# Patient Record
Sex: MALE | Race: WHITE | NOT HISPANIC OR LATINO | Employment: UNEMPLOYED | URBAN - METROPOLITAN AREA
[De-identification: names, ages, dates, MRNs, and addresses within clinical notes are randomized per-mention and may not be internally consistent; named-entity substitution may affect disease eponyms.]

---

## 2021-12-14 ENCOUNTER — HOSPITAL ENCOUNTER (EMERGENCY)
Facility: CLINIC | Age: 21
Discharge: HOME | End: 2021-12-15
Attending: EMERGENCY MEDICINE | Admitting: EMERGENCY MEDICINE
Payer: COMMERCIAL

## 2021-12-14 VITALS
SYSTOLIC BLOOD PRESSURE: 140 MMHG | RESPIRATION RATE: 20 BRPM | OXYGEN SATURATION: 100 % | DIASTOLIC BLOOD PRESSURE: 81 MMHG | HEART RATE: 54 BPM | WEIGHT: 160 LBS | TEMPERATURE: 97.5 F

## 2021-12-14 DIAGNOSIS — S02.2XXA CLOSED FRACTURE OF NASAL BONE, INITIAL ENCOUNTER: ICD-10-CM

## 2021-12-14 DIAGNOSIS — S06.0X0A CONCUSSION WITHOUT LOSS OF CONSCIOUSNESS, INITIAL ENCOUNTER: ICD-10-CM

## 2021-12-14 PROCEDURE — 99284 EMERGENCY DEPT VISIT MOD MDM: CPT | Mod: 25

## 2021-12-14 ASSESSMENT — ENCOUNTER SYMPTOMS
NAUSEA: 1
NUMBNESS: 0
WEAKNESS: 0
DIZZINESS: 1
VOMITING: 0
HEADACHES: 1

## 2021-12-15 ENCOUNTER — APPOINTMENT (OUTPATIENT)
Dept: CT IMAGING | Facility: CLINIC | Age: 21
End: 2021-12-15
Attending: EMERGENCY MEDICINE
Payer: COMMERCIAL

## 2021-12-15 PROCEDURE — 70450 CT HEAD/BRAIN W/O DYE: CPT
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
## 2023-07-08 ENCOUNTER — APPOINTMENT (OUTPATIENT)
Dept: CT IMAGING | Facility: CLINIC | Age: 23
End: 2023-07-08
Attending: PHYSICIAN ASSISTANT
Payer: COMMERCIAL

## 2023-07-08 ENCOUNTER — HOSPITAL ENCOUNTER (EMERGENCY)
Facility: CLINIC | Age: 23
Discharge: HOME | End: 2023-07-08
Admitting: PHYSICIAN ASSISTANT
Payer: COMMERCIAL

## 2023-07-08 ENCOUNTER — APPOINTMENT (OUTPATIENT)
Dept: ULTRASOUND IMAGING | Facility: CLINIC | Age: 23
End: 2023-07-08
Attending: PHYSICIAN ASSISTANT
Payer: COMMERCIAL

## 2023-07-08 VITALS
DIASTOLIC BLOOD PRESSURE: 63 MMHG | TEMPERATURE: 98.5 F | HEART RATE: 46 BPM | OXYGEN SATURATION: 98 % | RESPIRATION RATE: 14 BRPM | WEIGHT: 210 LBS | SYSTOLIC BLOOD PRESSURE: 115 MMHG

## 2023-07-08 DIAGNOSIS — R10.9 CHRONIC ABDOMINAL PAIN: ICD-10-CM

## 2023-07-08 DIAGNOSIS — F12.90 MARIJUANA USE: ICD-10-CM

## 2023-07-08 DIAGNOSIS — R79.89 ELEVATED LFTS: ICD-10-CM

## 2023-07-08 DIAGNOSIS — G89.29 CHRONIC ABDOMINAL PAIN: ICD-10-CM

## 2023-07-08 LAB
ALBUMIN SERPL-MCNC: 4.2 G/DL (ref 3.5–5)
ALP SERPL-CCNC: 80 U/L (ref 45–120)
ALT SERPL W P-5'-P-CCNC: 156 U/L (ref 0–45)
ANION GAP SERPL CALCULATED.3IONS-SCNC: 7 MMOL/L (ref 5–18)
AST SERPL W P-5'-P-CCNC: 59 U/L (ref 0–40)
BILIRUB DIRECT SERPL-MCNC: 0.2 MG/DL
BILIRUB SERPL-MCNC: 0.6 MG/DL (ref 0–1)
BUN SERPL-MCNC: 11 MG/DL (ref 8–22)
CALCIUM SERPL-MCNC: 9.6 MG/DL (ref 8.5–10.5)
CHLORIDE BLD-SCNC: 105 MMOL/L (ref 98–107)
CO2 SERPL-SCNC: 29 MMOL/L (ref 22–31)
CREAT SERPL-MCNC: 1.05 MG/DL (ref 0.7–1.3)
ERYTHROCYTE [DISTWIDTH] IN BLOOD BY AUTOMATED COUNT: 13 % (ref 10–15)
GFR SERPL CREATININE-BSD FRML MDRD: >90 ML/MIN/1.73M2
GLUCOSE BLD-MCNC: 84 MG/DL (ref 70–125)
HCT VFR BLD AUTO: 47.2 % (ref 40–53)
HGB BLD-MCNC: 15.8 G/DL (ref 13.3–17.7)
LIPASE SERPL-CCNC: 20 U/L (ref 0–52)
MCH RBC QN AUTO: 28.6 PG (ref 26.5–33)
MCHC RBC AUTO-ENTMCNC: 33.5 G/DL (ref 31.5–36.5)
MCV RBC AUTO: 86 FL (ref 78–100)
PLATELET # BLD AUTO: 338 10E3/UL (ref 150–450)
POTASSIUM BLD-SCNC: 4.3 MMOL/L (ref 3.5–5)
PROT SERPL-MCNC: 7.1 G/DL (ref 6–8)
RBC # BLD AUTO: 5.52 10E6/UL (ref 4.4–5.9)
SODIUM SERPL-SCNC: 141 MMOL/L (ref 136–145)
WBC # BLD AUTO: 8.1 10E3/UL (ref 4–11)

## 2023-07-08 PROCEDURE — 80053 COMPREHEN METABOLIC PANEL: CPT | Performed by: PHYSICIAN ASSISTANT

## 2023-07-08 PROCEDURE — 250N000013 HC RX MED GY IP 250 OP 250 PS 637: Performed by: PHYSICIAN ASSISTANT

## 2023-07-08 PROCEDURE — 74176 CT ABD & PELVIS W/O CONTRAST: CPT

## 2023-07-08 PROCEDURE — 82248 BILIRUBIN DIRECT: CPT | Performed by: PHYSICIAN ASSISTANT

## 2023-07-08 PROCEDURE — 83690 ASSAY OF LIPASE: CPT | Performed by: PHYSICIAN ASSISTANT

## 2023-07-08 PROCEDURE — 36415 COLL VENOUS BLD VENIPUNCTURE: CPT | Performed by: PHYSICIAN ASSISTANT

## 2023-07-08 PROCEDURE — 76705 ECHO EXAM OF ABDOMEN: CPT

## 2023-07-08 PROCEDURE — 99284 EMERGENCY DEPT VISIT MOD MDM: CPT | Mod: 25

## 2023-07-08 PROCEDURE — 85027 COMPLETE CBC AUTOMATED: CPT | Performed by: PHYSICIAN ASSISTANT

## 2023-07-08 PROCEDURE — 250N000011 HC RX IP 250 OP 636: Performed by: PHYSICIAN ASSISTANT

## 2023-07-08 RX ORDER — ONDANSETRON 4 MG/1
4 TABLET, ORALLY DISINTEGRATING ORAL ONCE
Status: COMPLETED | OUTPATIENT
Start: 2023-07-08 | End: 2023-07-08

## 2023-07-08 RX ORDER — MAGNESIUM HYDROXIDE/ALUMINUM HYDROXICE/SIMETHICONE 120; 1200; 1200 MG/30ML; MG/30ML; MG/30ML
15 SUSPENSION ORAL ONCE
Status: COMPLETED | OUTPATIENT
Start: 2023-07-08 | End: 2023-07-08

## 2023-07-08 RX ORDER — ONDANSETRON 4 MG/1
4 TABLET, ORALLY DISINTEGRATING ORAL EVERY 8 HOURS PRN
Qty: 10 TABLET | Refills: 0 | Status: SHIPPED | OUTPATIENT
Start: 2023-07-08 | End: 2023-07-11

## 2023-07-08 RX ADMIN — ALUMINUM HYDROXIDE, MAGNESIUM HYDROXIDE, AND DIMETHICONE 15 ML: 200; 20; 200 SUSPENSION ORAL at 14:06

## 2023-07-08 RX ADMIN — ONDANSETRON 4 MG: 4 TABLET, ORALLY DISINTEGRATING ORAL at 13:57

## 2023-07-08 ASSESSMENT — ENCOUNTER SYMPTOMS
FEVER: 0
NAUSEA: 1
DYSURIA: 0
CHILLS: 0
SHORTNESS OF BREATH: 0
ABDOMINAL PAIN: 1
BACK PAIN: 0
CONSTIPATION: 0
HEMATURIA: 0
VOMITING: 1
FREQUENCY: 0
DIARRHEA: 0
BLOOD IN STOOL: 0

## 2023-07-08 ASSESSMENT — ACTIVITIES OF DAILY LIVING (ADL): ADLS_ACUITY_SCORE: 35

## 2023-07-08 NOTE — DISCHARGE INSTRUCTIONS
As we discussed, your CT and ultrasound showed no findings to explain your pain.  Your liver function tests are mildly elevated.  I have placed a referral for follow-up with a GI provider as well as a primary care provider, and included phone numbers above - please call to schedule.  We will start you on the medicine omeprazole to see if this improves your symptoms and I also provided prescription for Zofran for nausea.  If it anytime you develop fever, uncontrollable vomiting, worsening pain, black or bloody stools or vomit, please return to the ER for further evaluation.  Please avoid alcohol, spicy or acidic foods such as caffeine, coffee, etc.

## 2023-07-08 NOTE — ED TRIAGE NOTES
pt c/o mid abdominal pain for awhile and area tightens up, started 2 years ago comes and goes started about 1 week ago.  Pt states nausea and vomited twice, pain gets worse when eating.

## 2023-07-08 NOTE — ED PROVIDER NOTES
Emergency Department Encounter   NAME: Slim Orellana ; AGE: 22 year old male ; YOB: 2000 ; MRN: [id number] ; PCP: No Ref-Primary, Physician   ED PROVIDER: Merced Campbell PA-C    Evaluation Date & Time:   No admission date for patient encounter.    CHIEF COMPLAINT:  Abdominal Pain      Impression and Plan   MDM: Slim Orellana is a 22 year old male with a pertinent history of ADHD, major depressive disorder, generalized anxiety disorder, asthma, reactive airway disease, who presents to the ED by private vehicle for evaluation of abdominal pain.  The patient presents to the emergency department for evaluation of episodes of abdominal pain, nausea, and vomiting in the last 1 week that have been present over the past 2 years.  A similar episode started a week ago for him.  Here in the ED, he is afebrile and vitally stable.  He is very pleasant and in no acute distress.  This is a very well-appearing patient.  His abdomen is soft with normal bowel sounds and there is no distention, rebound, guarding, or evidence of peritonitis.  He does have tenderness over his right upper quadrant and left upper quadrant though no epigastric tenderness.  Considered a broad differential though my clinical suspicion for any acute or emergent surgical pathology in the abdomen or pelvis is quite low given 2 years of similar episodes.  Discussed plan for laboratory work-up with stepmother which they are agreeable to.  GI cocktail and Zofran ODT ordered for symptomatic relief. CT of abdomen/pelvis wo contrast ordered to rule out partial or intermittent obstructive process.     CT unremarkable.  Appendix visualized with normal appearance and no fever or leukocytosis.  No evidence of diverticulitis, colitis, or obstruction.  No active dark appearing emesis or stool, or khang blood, and hemoglobin hematocrit are within normal limits -no concern for active life-threatening GI bleed.  Despite vomiting, no concerning metabolic  derangements, ULISES, or evidence of significant dehydration.  His LFTs were mildly elevated with no previous for comparison.  Right upper quadrant ultrasound ordered which showed a normal appearance of the gallbladder, bile ducts, and the liver.  No evidence of acute cholecystitis, choledocholithiasis, cholangitis.  He reports to heavy drinking up until a year ago, and this could be the source of his mild liver abnormalities.  Patient was given a GI cocktail here in the ED and did have some mild improvement in his discomfort.  His overall presentation is more consistent with gastritis versus PUD versus cyclical vomiting syndrome in the setting of marijuana use.  Given multiple years of ongoing symptoms, will refer him to GI as well as a primary care provider to schedule outpatient cares.  Prescription for Zofran ODT and twice daily omeprazole provided and we discussed foods to avoid and lifestyle changes for home.  Patient will contact both GI and primary care to schedule follow-up.  We reviewed concerning signs and symptoms to return to the ED and he verbalized understanding.  Discharged home in good condition.      *Patient examination and workup was initiated in triage due to inpatient hospital and Emergency Department bed shortage resulting in long waiting room wait times. Patient and/or guardian's consent was obtained.       Medical Decision Making    History:    Supplemental history from: Documented in chart, if applicable    External Record(s) reviewed: Documented in chart, if applicable.    Work Up:    Chart documentation includes differential considered and any EKGs or imaging independently interpreted by provider, where specified.    In additional to work up documented, I considered the following work up: Documented in chart, if applicable.    External consultation:    Discussion of management with another provider: Documented in chart, if applicable    Complicating factors:    Care impacted by chronic  illness: N/A    Care affected by social determinants of health: N/A    Disposition considerations: Discharge. I prescribed additional prescription strength medication(s) as charted. See documentation for any additional details.      ED COURSE:  12:38 PM I met and introduced myself to the patient. I gathered initial history and performed my physical exam. We discussed plan for initial workup.   2:45 PM I rechecked the patient and discussed results and plan for US.  3:42 PM I rechecked the patient and discussed results, discharge, follow up, and reasons to return to the ED.     At the conclusion of the encounter I discussed the results of all the tests and the disposition. The questions were answered. The patient or family acknowledged understanding and was agreeable with the care plan.    FINAL IMPRESSION:    ICD-10-CM    1. Chronic abdominal pain  R10.9 Primary Care Referral    G89.29 Adult GI  Referral - Consult Only      2. Elevated LFTs  R79.89 Primary Care Referral     Adult GI  Referral - Consult Only      3. Marijuana use  F12.90 Primary Care Referral            MEDICATIONS GIVEN IN THE EMERGENCY DEPARTMENT:  Medications   ondansetron (ZOFRAN ODT) ODT tab 4 mg (4 mg Oral $Given 7/8/23 9749)   alum & mag hydroxide-simethicone (MAALOX) suspension 15 mL (15 mLs Oral $Given 7/8/23 1405)         NEW PRESCRIPTIONS STARTED AT TODAY'S ED VISIT:  Discharge Medication List as of 7/8/2023  3:47 PM      START taking these medications    Details   omeprazole (PRILOSEC) 20 MG DR capsule Take 1 capsule (20 mg) by mouth 2 times daily for 30 days, Disp-60 capsule, R-0, Local Print      ondansetron (ZOFRAN ODT) 4 MG ODT tab Take 1 tablet (4 mg) by mouth every 8 hours as needed for nausea, Disp-10 tablet, R-0, Local Print               HPI   Patient information was obtained from: Patient    Use of Intrepreter: N/A     Slim JORGE Orellana is a 22 year old male with a pertinent history of ADHD, major depressive  disorder, generalized anxiety disorder, asthma, reactive airway disease, who presents to the ED by private vehicle for evaluation of abdominal pain.     Per patient, he states that over the past 2 years, he has intermittent episodes of pain in his abdomen with nausea and vomiting.  These episodes typically last for about a week.  He says after eating, his abdomen tightens up, and then an hour later, he will vomit up the food.  In the past, he has had dark appearing vomit to though not recently.  No black or bloody stools.  He does not feel nauseated when he is not eating.  His episode started about a week ago, and he has had minimal oral intake this week.  He is here with his stepmother, who was concerned about his eating, prompting their visit to the ER.  He has not had any fevers or chills recently, no diarrhea or constipation.  No previous abdominal surgeries.  He states that he used to be a heavy drinker, although no alcohol use recently.  He does smoke daily marijuana, however did stop this for 2 months and his symptoms did not get better.        REVIEW OF SYSTEMS:  Review of Systems   Constitutional: Negative for chills and fever.   Respiratory: Negative for shortness of breath.    Cardiovascular: Negative for chest pain.   Gastrointestinal: Positive for abdominal pain, nausea and vomiting. Negative for blood in stool, constipation and diarrhea.   Genitourinary: Negative.  Negative for dysuria, frequency and hematuria.   Musculoskeletal: Negative for back pain.         Medical History     No past medical history on file.    No past surgical history on file.    No family history on file.         omeprazole (PRILOSEC) 20 MG DR capsule  ondansetron (ZOFRAN ODT) 4 MG ODT tab          Physical Exam     First Vitals:  Patient Vitals for the past 24 hrs:   BP Temp Temp src Pulse Resp SpO2 Weight   07/08/23 1552 115/63 -- -- (!) 46 -- 98 % --   07/08/23 1225 125/52 98.5  F (36.9  C) Temporal 52 14 96 % 95.3 kg (210  lb)         PHYSICAL EXAM:   Physical Exam  Vitals and nursing note reviewed.   Constitutional:       General: He is not in acute distress.     Appearance: He is not ill-appearing, toxic-appearing or diaphoretic.   HENT:      Head: Normocephalic.   Cardiovascular:      Rate and Rhythm: Normal rate and regular rhythm.      Heart sounds: Normal heart sounds.   Pulmonary:      Effort: Pulmonary effort is normal.      Breath sounds: Normal breath sounds.   Abdominal:      General: Abdomen is flat. Bowel sounds are normal. There is no distension.      Palpations: Abdomen is soft.      Tenderness: There is abdominal tenderness (RUQ and LUQ). There is no right CVA tenderness, left CVA tenderness, guarding or rebound. Negative signs include Mccurdy's sign and McBurney's sign.      Hernia: No hernia is present.   Skin:     General: Skin is warm and dry.   Neurological:      Mental Status: He is alert.             Results     LAB:  All pertinent labs reviewed and interpreted  Labs Ordered and Resulted from Time of ED Arrival to Time of ED Departure   HEPATIC FUNCTION PANEL - Abnormal       Result Value    Bilirubin Total 0.6      Bilirubin Direct 0.2      Protein Total 7.1      Albumin 4.2      Alkaline Phosphatase 80      AST 59 (*)      (*)    CBC WITH PLATELETS - Normal    WBC Count 8.1      RBC Count 5.52      Hemoglobin 15.8      Hematocrit 47.2      MCV 86      MCH 28.6      MCHC 33.5      RDW 13.0      Platelet Count 338     BASIC METABOLIC PANEL - Normal    Sodium 141      Potassium 4.3      Chloride 105      Carbon Dioxide (CO2) 29      Anion Gap 7      Urea Nitrogen 11      Creatinine 1.05      Calcium 9.6      Glucose 84      GFR Estimate >90     LIPASE - Normal    Lipase 20         RADIOLOGY:  Abdomen US, limited (RUQ only)   Final Result   IMPRESSION:   1.  Unremarkable exam.            CT Abdomen Pelvis w/o Contrast   Final Result   IMPRESSION:    1.  Nothing to explain the patient's pain or nausea.       2.  Mild diverticulosis in the colon.             Merced Campbell PA-C   Emergency Medicine   [facility] EMERGENCY ROOM       Merced Campbell PA-C  07/08/23 2984

## 2023-07-14 NOTE — CONFIDENTIAL NOTE
DIAGNOSIS:    Chronic abdominal pain   Elevated LFTs      Appt Date: 09.21.2023   NOTES STATUS DETAILS   OFFICE NOTE from referring provider Internal 07.08.2023 Merced Campbell PA-C   OFFICE NOTES from other specialists     DISCHARGE SUMMARY from hospital Internal 07.08.2023 Merced Campbell PA-C   MEDICATION LIST Internal    LIVER BIOSPY (IF APPLICABLE)      PATHOLOGY REPORTS      IMAGING     ENDOSCOPY (IF AVAILABLE)     COLONOSCOPY (IF AVAILABLE)     ULTRASOUND LIVER Internal 07.08.2023 US ABDOMEN LIMITED   CT OF ABDOMEN Internal 07.08.2023 CT ABDOMEN PELVIS W/O CONTRAST   MRI OF LIVER     FIBROSCAN, US ELASTOGRAPHY, FIBROSIS SCAN, MR ELASTOGRAPHY     LABS     HEPATIC PANEL (LIVER PANEL) Internal 07.08.2023   BASIC METABOLIC PANEL Internal 07.08.2023   COMPLETE METABOLIC PANEL Internal 07.08.2023   COMPLETE BLOOD COUNT (CBC) Internal 07.08.2023   INTERNATIONAL NORMALIZED RATIO (INR)     HEPATITIS C ANTIBODY     HEPATITIS C VIRAL LOAD/PCR     HEPATITIS C GENOTYPE     HEPATITIS B SURFACE ANTIGEN     HEPATITIS B SURFACE ANTIBODY     HEPATITIS B DNA QUANT LEVEL     HEPATITIS B CORE ANTIBODY

## 2023-07-17 NOTE — TELEPHONE ENCOUNTER
REFERRAL INFORMATION:    Referring Provider: Merced Campbell PA-C    Referring Clinic:  [facility] ED    Reason for Visit/Diagnosis: Chronic abdominal pain and elevated LFTs     FUTURE VISIT INFORMATION:    Appointment Date: 7/26/23    Appointment Time: 10:45 AM     NOTES STATUS DETAILS   OFFICE NOTE from Referring Provider N/A    OFFICE NOTE from Other Specialist N/A    HOSPITAL DISCHARGE SUMMARY/  ED VISITS Internal [facility]:  7/8/23- ED visit with Merced Campbell PA-C   OPERATIVE REPORT N/A    MEDICATION LIST Internal         ENDOSCOPY  N/A    COLONOSCOPY N/A    ERCP N/A    EUS N/A    STOOL TESTING N/A    PERTINENT LABS Internal [facility]:  7/8/23- Lipase, Hepatic function, BMP, & CBC   PATHOLOGY REPORTS (RELATED) N/A    IMAGING (CT, MRI, EGD, MRCP, Small Bowel Follow Through/SBT, MR/CT Enterography) Internal [facility]:  7/8/23- US Abd  7/8/23- CT Abd/Pelvis

## 2023-07-18 ENCOUNTER — TELEPHONE (OUTPATIENT)
Dept: GASTROENTEROLOGY | Facility: CLINIC | Age: 23
End: 2023-07-18
Payer: COMMERCIAL

## 2023-07-18 NOTE — TELEPHONE ENCOUNTER
Called to remind patient of their upcoming appointment with our GI clinic, on Wednesday, July 26th at 10:45am with Chinyere Rodriguez. This appointment is scheduled as a video visit. You will receive a call approximately 30 minutes prior to check you in, you must be in [place] for this visit., if your appointment is virtual (video or telephone) you need to be in [place] for the visit. To reschedule or cancel patient to call [phone].    Montse Diaz

## 2023-07-26 ENCOUNTER — PRE VISIT (OUTPATIENT)
Dept: GASTROENTEROLOGY | Facility: CLINIC | Age: 23
End: 2023-07-26

## 2023-09-21 ENCOUNTER — PRE VISIT (OUTPATIENT)
Dept: GASTROENTEROLOGY | Facility: CLINIC | Age: 23
End: 2023-09-21
Payer: COMMERCIAL

## 2023-09-21 ENCOUNTER — VIRTUAL VISIT (OUTPATIENT)
Dept: GASTROENTEROLOGY | Facility: CLINIC | Age: 23
End: 2023-09-21
Attending: INTERNAL MEDICINE
Payer: COMMERCIAL

## 2023-09-21 VITALS — WEIGHT: 185 LBS

## 2023-09-21 DIAGNOSIS — G89.29 CHRONIC ABDOMINAL PAIN: ICD-10-CM

## 2023-09-21 DIAGNOSIS — R10.9 CHRONIC ABDOMINAL PAIN: ICD-10-CM

## 2023-09-21 DIAGNOSIS — R79.89 ELEVATED LFTS: ICD-10-CM

## 2023-09-21 DIAGNOSIS — R10.12 ABDOMINAL PAIN, LEFT UPPER QUADRANT: Primary | ICD-10-CM

## 2023-09-21 DIAGNOSIS — K52.9 CHRONIC DIARRHEA: ICD-10-CM

## 2023-09-21 PROCEDURE — 99204 OFFICE O/P NEW MOD 45 MIN: CPT | Mod: VID | Performed by: INTERNAL MEDICINE

## 2023-09-21 ASSESSMENT — PAIN SCALES - GENERAL: PAINLEVEL: NO PAIN (0)

## 2023-09-21 NOTE — PROGRESS NOTES
"Slim Orellana is a 22 year old male who is being evaluated via a billable video visit.    Virtual Visit Details    Type of service:  Video Visit   Video Start Time:  0830  Video End Time: 0905    Originating Location (pt. Location): Home    Distant Location (provider location):  On-site  Platform used for Video Visit: [employer]    Date of Service: 9/21/2023     Referring Provider: Merced CHADWICK    Subjective:            Slim Orellana is a 22 year old male presenting for evaluation of abnormal liver tests    History of Present Illness   Slim Orellana is a 22 year old male with past medical history of ADHD, depression, general anxiety disorder, remote history of methamphetamine use, history of severe alcohol use disorder in remission who presents with concerns of upper quadrant pain, and one-time episode of abnormal liver tests.    [Per history obtained from [facility] and confirmed with patient]  Patient describes recurrent LUQ pain that has been recurrent for the past two years. He states that he was previously using smoking methamphetamines since 16 years old, along with alcohol use of up to 1.75L every two-three days. He stopped using meth around two years ago, and noticed the abdominal pain starting shortly afterwards. The abdominal pain started when he was eating more meals mostly consisting of fried fast food. His appetite had increased since stopping meth use, so he had increased his meal count to 3 meals from the previous roughly 1 or 2 a day. He states that the pain is located \"just below his left rib\" and feels like a \"brick, heavy, with sharp needles\" without radiation. After the pain would start he felt nauseous and would proceed to vomit. The pain would vary in how long it would last with some days being constant. He says that meals would \"start the pain\", and that drinking \"2-3 cups of water\" would help. During these times he would start to feel a \"bile acidity\" in the back of his throat. He endorses " "hx of bloody colored emesis from repeated bouts of vomiting almost daily, diarrhea which was was a brown and green watery consistency up to 3 times a day. He denies any fevers, travel within past 5-10 years, hx of abdominal trauma, supplement use, recent STI testing, or acetaminophen or ibuprofen use.     On 7/08/2023: Patient was experiencing the worst abdominal pain he's ever had. He says that it was on his whole left side and extended down below his belly button. He had continued profuse vomiting, diarrhea and was unable to \"keep anything down\". He then went in to the ED for evaluation.     In the ED 7/08/2023:  Afebrile endorsing abdominal pain with nausea and vomiting. CT abdomen/pelvis wo contrast unremarkable, RUQ ultrasound without gallstones, normal appearing gallbladder, bile ducts and liver. AST 59, , with other labs unremarkable     After the ED: He states that he stopped consuming alcohol or meth. He also started a new diet of \"no fried food, red sauce or oils\", which has improved abdominal pain. He states that now he gets the abdominal pain \"once every 2-3 weeks\" and is located \"across the underside of my ribs across my stomach\". He endorses continued \"diarrhea\" of 2-3 watery green bowel movements a day. He still endorses occasional nausea with 1x vomiting when experiencing abdominal pain but has improved.    Past Medical History:  Depression  Generalized anxiety disorder  ADHD  History of methamphetamine use disorder  History of alcohol use disorder    Surgical History:  No past surgical history on file.    Social History:    Lives with parents in house  Alcohol: 1.75L vodka every 2-3 days (stopped 2 months ago)  Illicit: Previous meth use (started at 16, stopped 2 months ago)  Current marijuana use  1x heroin use  Smokes approximately 1 pack of cigarettes per day     Sexual  - Currently sexually active (prefers both males and females)  - States he uses condoms with every sexual encounter  - " Currently single    Family History:  No overt family history of liver disease.    Medications:  No current outpatient medications on file.     No current facility-administered medications for this visit.       Review of Systems  A complete 10 point review of systems was asked and answered in the negative unless specifically commented upon in the HPI    Objective:         Vitals:    09/21/23 0810   Weight: 83.9 kg (185 lb)     There is no height or weight on file to calculate BMI.     Physical Exam    Vitals reviewed.   Constitutional: Well-developed, well-nourished, in no apparent distress.    HEENT: Normocephalic. no scleral icterus.   Neck/Lymph: Normal ROM  Respiratory: Normal respiratory excursion   Skin:  no overt jaundice  Musculoskeletal:  ROM intact, normal muscle bulk    Psychiatric: Normal mood and affect. Behavior is normal.  Neuro:  no tremor    Labs and Diagnostic tests:  Lab Results   Component Value Date    BILITOTAL 0.6 07/08/2023     07/08/2023    AST 59 07/08/2023    ALKPHOS 80 07/08/2023     Lab Results   Component Value Date    ALBUMIN 4.2 07/08/2023    PROTTOTAL 7.1 07/08/2023          Imaging:  reviewed        Assessment and Plan:    Abnormal Liver Tests:    -One-time abnormal lab value in July with elevated ALT and AST is of unclear significance  -Certainly could have been related to methamphetamine or alcohol use that was occurring at that time  -Imaging did not demonstrate any overt acute or chronic changes to explain liver disease.    - We will repeat LFTs  - Screening for HBV, HCV, HIV    LUQ pain:  - Will obtain an EGD with biopsies given persistent symptoms    Diarrhea:  - Unclear etiology  - Will obtain stool studies  - PRN Imodium    Follow Up:  PRN     Thank you very much for the opportunity to participate in the care of this patient.  If you have any further questions, please don't hesitate to contact our office.    Thomas M. Leventhal, M.D.   of  Medicine  Advanced & Transplant Hepatology  [facility]

## 2023-09-21 NOTE — NURSING NOTE
Is the patient currently in the state of [place]? YES    Visit mode:VIDEO    If the visit is dropped, the patient can be reconnected by: VIDEO VISIT: Text to cell phone:   Telephone Information:   Mobile [phone]       Will anyone else be joining the visit? NO  (If patient encounters technical issues they should call [phone])    How would you like to obtain your AVS? MyChart    Are changes needed to the allergy or medication list? No    Reason for visit: Video Visit (Consult)    Rosa RICHARDS

## 2023-09-21 NOTE — LETTER
"    9/21/2023         RE: Slim Orellana  [address]  [facility] [place] [id number]        Dear Colleague,    Thank you for referring your patient, Slim Orellana, to the [facility] [place]. Please see a copy of my visit note below.        Date of Service: 9/21/2023     Referring Provider: Merced CHADWICK    Subjective:            Slim Orellana is a 22 year old male presenting for evaluation of abnormal liver tests    History of Present Illness   Slim Orellana is a 22 year old male with past medical history of ADHD, depression, general anxiety disorder, remote history of methamphetamine use, history of severe alcohol use disorder in remission who presents with concerns of upper quadrant pain, and one-time episode of abnormal liver tests.    [Per history obtained from [facility] and confirmed with patient]  Patient describes recurrent LUQ pain that has been recurrent for the past two years. He states that he was previously using smoking methamphetamines since 16 years old, along with alcohol use of up to 1.75L every two-three days. He stopped using meth around two years ago, and noticed the abdominal pain starting shortly afterwards. The abdominal pain started when he was eating more meals mostly consisting of fried fast food. His appetite had increased since stopping meth use, so he had increased his meal count to 3 meals from the previous roughly 1 or 2 a day. He states that the pain is located \"just below his left rib\" and feels like a \"brick, heavy, with sharp needles\" without radiation. After the pain would start he felt nauseous and would proceed to vomit. The pain would vary in how long it would last with some days being constant. He says that meals would \"start the pain\", and that drinking \"2-3 cups of water\" would help. During these times he would start to feel a \"bile acidity\" in the back of his throat. He endorses hx of bloody colored emesis from repeated bouts of " "vomiting almost daily, diarrhea which was was a brown and green watery consistency up to 3 times a day. He denies any fevers, travel within past 5-10 years, hx of abdominal trauma, supplement use, recent STI testing, or acetaminophen or ibuprofen use.     On 7/08/2023: Patient was experiencing the worst abdominal pain he's ever had. He says that it was on his whole left side and extended down below his belly button. He had continued profuse vomiting, diarrhea and was unable to \"keep anything down\". He then went in to the ED for evaluation.     In the ED 7/08/2023:  Afebrile endorsing abdominal pain with nausea and vomiting. CT abdomen/pelvis wo contrast unremarkable, RUQ ultrasound without gallstones, normal appearing gallbladder, bile ducts and liver. AST 59, , with other labs unremarkable     After the ED: He states that he stopped consuming alcohol or meth. He also started a new diet of \"no fried food, red sauce or oils\", which has improved abdominal pain. He states that now he gets the abdominal pain \"once every 2-3 weeks\" and is located \"across the underside of my ribs across my stomach\". He endorses continued \"diarrhea\" of 2-3 watery green bowel movements a day. He still endorses occasional nausea with 1x vomiting when experiencing abdominal pain but has improved.    Past Medical History:  Depression  Generalized anxiety disorder  ADHD  History of methamphetamine use disorder  History of alcohol use disorder    Surgical History:  No past surgical history on file.    Social History:  SH  Lives with parents in house  Alcohol: 1.75L vodka every 2-3 days (stopped 2 months ago)  Illicit: Previous meth use (started at 16, stopped 2 months ago)  Current marijuana use  1x heroin use  Smokes approximately 1 pack of cigarettes per day     Sexual  - Currently sexually active (prefers both males and females)  - States he uses condoms with every sexual encounter  - Currently single    Family History:  No overt " family history of liver disease.    Medications:  No current outpatient medications on file.     No current facility-administered medications for this visit.       Review of Systems  A complete 10 point review of systems was asked and answered in the negative unless specifically commented upon in the HPI    Objective:         Vitals:    09/21/23 0810   Weight: 83.9 kg (185 lb)     There is no height or weight on file to calculate BMI.     Physical Exam    Vitals reviewed.   Constitutional: Well-developed, well-nourished, in no apparent distress.    HEENT: Normocephalic. no scleral icterus.   Neck/Lymph: Normal ROM  Respiratory: Normal respiratory excursion   Skin:  no overt jaundice  Musculoskeletal:  ROM intact, normal muscle bulk    Psychiatric: Normal mood and affect. Behavior is normal.  Neuro:  no tremor    Labs and Diagnostic tests:  Lab Results   Component Value Date    BILITOTAL 0.6 07/08/2023     07/08/2023    AST 59 07/08/2023    ALKPHOS 80 07/08/2023     Lab Results   Component Value Date    ALBUMIN 4.2 07/08/2023    PROTTOTAL 7.1 07/08/2023          Imaging:  reviewed        Assessment and Plan:    Abnormal Liver Tests:    -One-time abnormal lab value in July with elevated ALT and AST is of unclear significance  -Certainly could have been related to methamphetamine or alcohol use that was occurring at that time  -Imaging did not demonstrate any overt acute or chronic changes to explain liver disease.    - We will repeat LFTs  - Screening for HBV, HCV, HIV    LUQ pain:  - Will obtain an EGD with biopsies given persistent symptoms    Diarrhea:  - Unclear etiology  - Will obtain stool studies  - PRN Imodium    Follow Up:  PRN     Thank you very much for the opportunity to participate in the care of this patient.  If you have any further questions, please don't hesitate to contact our office.    Thomas M. Leventhal, M.D.  Associate Professor of Medicine  Advanced & Transplant Hepatology  The  [facility]      Again, thank you for allowing me to participate in the care of your patient.        Sincerely,        Thomas M. Leventhal, MD

## 2023-09-21 NOTE — PATIENT INSTRUCTIONS
- EGD to evaluate your abdominal pain    - Blood tests to recheck your liver    - stool tests to eval     - Okay to Imodium for loose stools

## 2023-09-25 ENCOUNTER — TELEPHONE (OUTPATIENT)
Dept: GASTROENTEROLOGY | Facility: CLINIC | Age: 23
End: 2023-09-25
Payer: COMMERCIAL

## 2023-09-25 NOTE — TELEPHONE ENCOUNTER
Pre assessment completed for upcoming procedure.      Procedure details:    Patient scheduled for Upper endoscopy (EGD) on 9/27/23.     Arrival time: 1400. Procedure time 1500    Pre op exam needed? N/A    Facility location: [facility]; [address] [place] [address], 5[address] Floor, [place] [address]    Sedation type: MAC    Indication for procedure: EGD with gastric biopsies for refractory n/v and abdominal pain     COVID policy reviewed.    Designated  policy reviewed. Instructed to have someone stay 24 hours post procedure.       Chart review:     Electronic implanted devices? No    Diabetic? No    Diabetic medication HOLDING recommendations: (if applicable)  Oral diabetic medications: N/A  Diabetic injectables: N/A  Insulin: N/A    Medication review:    Anticoagulants? No    NSAIDS? No    Other medication HOLDING recommendations:  N/A      Prep for procedure:     Prep instructions sent via email     [email or web address]  Patient requests communication via unencrypted e-mail. This includes prep instructions. Patient acknowledges that they have agreed to accept the risks and receive unencrypted communications for this incidence.     Reviewed procedure prep instructions.     Patient verbalized understanding and had no questions or concerns at this time.        Zayda Dale RN  Endoscopy Procedure Pre Assessment RN  [phone] option 4

## 2023-09-25 NOTE — LETTER
Upper endoscopy (EGD)     Procedure date: 9/27/2023    Anticipated arrival time: 2:00 PM   (Procedure Times are Subject to Change)    Facility location: [facility]; [address], 5th Floor, [place] [address] - Check in location: 5th Floor. Parking information: Self pay parking is available in West surface lot directly across from the  main entrance of the [facility] [place]. Entry and exit to this lot is on [facility]. In  addition, self pay parking is also available in [address] [employer] [address] ([address]).  We have dedicated patient only spots on 1st floor of [address] ramp.  services are available for patients with limited mobility. Services available Monday-Friday, 7:00a.m.-  5:00p.m.      Important Procedure Reminders:     Prep Instructions:   Instructions on how to prepare for your upcoming procedure are found below. Please read instructions carefully. Deviation from instructions may result in less than desired outcomes and procedure may need to be rescheduled. If you have additional questions regarding how to prepare for your upcoming procedure, please contact our endoscopy pre assessment nurses at [phone] option 4.      Policy:   On the day of your procedure, please designatean adult(s) who can drive you home stay with you for the next 24 hours. The medicines used in the exam will make you sleepy. You will not be able to drive. You cannot take public transportation, ride share services, or non-medical taxi service without a responsible caregiver.  Medical transport services are allowed with the requirement that a responsible caregiver will receive you at your destination.  We require that drivers and caregivers are confirmed prior to your procedure.    Day of procedure:  Please do not wear jewelry (i.e. earrings, rings, necklaces, watches, etc) . Leave your purse, billfold, credit cards, and other valuables at home.   Bring insurance card and ID.     To cancel or reschedule your  procedure:   Please call our endoscopy scheduling team at: [facility]: [phone], option 2. Monday through Friday, 7:00am-5:00pm.      Medication Reminders:    Please note the following medication holding recommendations:   N/A    ----------------------------------------------------------------------------------------------------------------------------------------------------------------------------------------------------------------------------------------------------------------------    Instructions for Your Upper Endoscopy    Pre-Assessment Phone Number: [facility]; [phone] option 4      You will receive a call from a Nurse to review instructions and health history.  This assessment must be completed prior to your procedure.  Failure to complete the Nurse assessment phone call may result in the procedure being cancelled.    On the day of your procedure, please designatean adult(s) who can drive you home stay with you for the next 24 hours. The medicines used in the exam will make you sleepy. You will not be able to drive.    You cannot take public transportation, ride share services, or non-medical taxi service without a responsible caregiver.  Medical transport services are allowed with the requirement that a responsible caregiver will receive you at your destination.  We require that drivers and caregivers are confirmed prior to your procedure.        What is an upper endoscopy?  - This is an exam that checks for problems linked to heartburn, swallowing or belly (abdominal) pain or other symptoms of the upper GI tract. Depending on your symptoms, the doctor will look at your esophagus (food pipe), stomach and the part of the stomach that enters the small intestine.     Getting ready  - Dress in comfortable, loose clothing.  - Bring your insurance card. Leave your purse, billfold, credit cards and other valuables at home.  - Bring a list of your medicines and  known allergies. If you have a pacemaker or ICD, please bring your information card.  - We do our best to stay on time, but there may be a delay. Please bring something to pass the time, such as a newspaper or book.    - Important: You must complete all steps before the exam.     Seven days before the exam   - Talk to your doctor: If you take blood-thinners (such as Coumadin, Plavix, Xarelto), your prescription or schedule may need to change before the test.  - Talk to your prescribing provider: If you take prescription NSAIDS (such as Sulindac, Celebrex, Mobic, Relafen). Your prescribing provider will tell you what to do.   - Continue taking prescribed aspirin; talk to your prescribing doctor with any concerns.    - If you have diabetes: Ask to have your exam early in the morning. Also, ask your doctor if you should change your diet or medicines    One day before the exam   - Stop eating all solid foods 8 hours prior to arrival time. You may drink clear liquids.   **If you have achalasia (treated or untreated) or gastroparesis, please be on a clear liquid diet for 24 hours prior to your exam and stop drinking all liquids at midnight.   - Stop taking sucralfate medication.  - Stop taking NSAID pain relievers, such as Advil, Ibuprofen, Motrin, etc.  You may take Tylenol.    Day of the exam  - You may drink clear liquids until 2 hours before your arrival time.  - You may take all of your morning medicines (except for diabetes pills) as usual with 4 oz. of water up to 2 hours before your arrival time.  - If you take diabetes medicine (pills): do not take them the morning of your test.  - Bring a list of your medicines and known allergies.  - Please do not wear jewelry (i.e. earrings, rings, necklaces, watches, etc) . Leave your purse, billfold, credit cards, and other valuables at home.   - Please arrive with an adult who can take you home after the test: The medicine will make you sleepy. If you do not have a ,  we may cancel your test.     What are clear liquids?   You may have:  - Water, tea, coffee (no cream)  - Soda pop, Gatorade   - Clear nutrition drinks (Enlive, Resource Breeze)   - Jell-O, Popsicles (no milk or fruit pieces) or sorbet   - Fat-free soup broth or bouillon  - Plain hard cand, such as clear life savers   - Clear juices and fruit-flavored drinks such as apple juice, white grape juice, Hi-C and Ja-Aid    Do not have:  - Milk or milk products such as ice cream, malts or shakes  - Juices with pulp such as orange, grapefruit, pineapple or tomato juice  - Cream soups of any kind  - Alcohol       During the exam  - The exam lasts from 10 to 20 minutes.   - We will review the risks and benefits of the exam. We will then ask you to sign a consent form.  - We will place a small needle (IV) in your hand or arm. We will give you medicine through the IV to keep you comfortable.   - The endoscope will be advanced through your mouth and the exam completed.  - When we put air into your stomach, you may feel full or have mild cramps. We will remove the air at the end of the exam.  - To reduce discomfort, breathe at a slow, even pace. Try to relax the muscles in your neck and shoulders.  - We may take a small piece of tissue (a biopsy) to test in the lab. It will not hurt. We may also take pictures of your insides.     After the exam  - You will rest in the recovery area until you feel ready to leave. This takes about 30 to 60 minutes.   - We will remove the IV.  - You may burp up air left in your stomach.  - You may feel drowsy or a little dizzy from the medicine.   - Your doctor will discuss your results. You will receive your test results in 7 to 10 business days by letter or MyChart.   - Your throat may feel numb. One hour after the exam, swallow a small amount of cool water. If you can swallow easily, you may go back to your regular diet and medicines.  - Your throat may be sore for the rest of the day. Throat  lozenges or ice chips may help.  - Do not drive for 24 hours.    Call us at once if you have:  - Unusual pain or problems swallowing, unusual stomach or chest pain.  - Vomit that looks like coffee grounds or black or bloody stools (bowel movements).  - A fever above 100.6  F (37.5  C) when taken under the tongue.    Test results  - You will receive your results in 7 to 10 business days by phone, letter or MyChart.    For questions or appointments, call:  [facility]   [phone], option 2  Monday through Friday, 7 a.m. to 5 p.m.  (If it is after hours please reach out to the clinic or provider you were scheduled with.)    You should be aware that your endoscopist may be a part of a study to improve endoscopy procedures.  As part of a study, pictures gathered from your procedure may be stored and analyzed in a de-identified manner.

## 2023-09-25 NOTE — TELEPHONE ENCOUNTER
"Endoscopy Scheduling Screen    Have you had a positive Covid test in the last 14 days?  No    Are you active on [facility]?   No - link already sent and patient will finish sign up to get appt info.     What insurance is in the chart?  Other:      Ordering/Referring Provider: LEVENTHAL, THOMAS MICHAEL    (If ordering provider performs procedure, schedule with ordering provider unless otherwise instructed. )    BMI: There is no height or weight on file to calculate BMI. 28.1    Sedation Ordered  MAC/deep sedation.   BMI<= 45 45 < BMI <= 48 48 < BMI < = 50  BMI > 50   No Restrictions No [facility]  No [facility]  [facility] with exceptions [facility] Only OR Only       Are you taking any prescription medications for pain 3 or more times per week?   No    Do you have a history of malignant hyperthermia or adverse reaction to anesthesia?  No     Have you been diagnosed or told you have pulmonary hypertension?   No    Do you have an LVAD?  No    Have you been told you have moderate to severe sleep apnea?  No    Have you been told you have COPD, asthma, or any other lung disease?  Yes     What breathing problems do you have?  Asthma     Do you use home oxygen?  No    Have your breathing problems required an ED visit or hospitalization in the last year?  No    Do you have any heart conditions?  No     Have you ever had an organ transplant?   No    Have you ever had or are you awaiting a heart or lung transplant?   No    Have you had a stroke or transient ischemic attack (TIA aka \"mini stroke\" in the last 6 months?   No    Have you been diagnosed with or been told you have cirrhosis of the liver?   No    Are you currently on dialysis?   No    Do you need assistance transferring?   No    BMI: There is no height or weight on file to calculate BMI. 28.1    Is patients BMI > 40 and scheduling location [facility]?  No    Do you take an injectable medication for weight loss or diabetes (excluding insulin)?  No    Do you take the medication " Naltrexone?  No    Do you take blood thinners?  No       Prep   Are you currently on dialysis or do you have chronic kidney disease?  No    Do you have a diagnosis of diabetes?  No    Do you have a diagnosis of cystic fibrosis (CF)?  No    On a regular basis do you go 3 -5 days between bowel movements?  No    BMI > 40?  No    Preferred Pharmacy:  No Pharmacies Listed    Final Scheduling Details   Colonoscopy prep sent?  N/A    Procedure scheduled  Upper endoscopy (EGD)    Surgeon:  JILLIAN     Date of procedure:  9/27/2023     Pre-OP / PAC:   No - Not required for this site.    Location  [facility] - Per order.    Sedation   MAC/Deep Sedation - Per order.      Patient Reminders:   You will receive a call from a Nurse to review instructions and health history.  This assessment must be completed prior to your procedure.  Failure to complete the Nurse assessment may result in the procedure being cancelled.      On the day of your procedure, please designate an adult(s) who can drive you home stay with you for the next 24 hours. The medicines used in the exam will make you sleepy. You will not be able to drive.      You cannot take public transportation, ride share services, or non-medical taxi service without a responsible caregiver.  Medical transport services are allowed with the requirement that a responsible caregiver will receive you at your destination.  We require that drivers and caregivers are confirmed prior to your procedure.

## 2023-09-27 ENCOUNTER — ANESTHESIA (OUTPATIENT)
Dept: SURGERY | Facility: AMBULATORY SURGERY CENTER | Age: 23
End: 2023-09-27
Payer: COMMERCIAL

## 2023-09-27 ENCOUNTER — ANESTHESIA EVENT (OUTPATIENT)
Dept: SURGERY | Facility: AMBULATORY SURGERY CENTER | Age: 23
End: 2023-09-27
Payer: COMMERCIAL

## 2023-09-27 ENCOUNTER — HOSPITAL ENCOUNTER (OUTPATIENT)
Facility: AMBULATORY SURGERY CENTER | Age: 23
Discharge: HOME | End: 2023-09-27
Attending: INTERNAL MEDICINE | Admitting: INTERNAL MEDICINE
Payer: COMMERCIAL

## 2023-09-27 VITALS
SYSTOLIC BLOOD PRESSURE: 122 MMHG | RESPIRATION RATE: 16 BRPM | WEIGHT: 185 LBS | HEIGHT: 68 IN | TEMPERATURE: 98 F | DIASTOLIC BLOOD PRESSURE: 74 MMHG | BODY MASS INDEX: 28.04 KG/M2 | HEART RATE: 81 BPM | OXYGEN SATURATION: 99 %

## 2023-09-27 VITALS — HEART RATE: 78 BPM

## 2023-09-27 DIAGNOSIS — K29.70 GASTRITIS WITHOUT BLEEDING, UNSPECIFIED CHRONICITY, UNSPECIFIED GASTRITIS TYPE: Primary | ICD-10-CM

## 2023-09-27 LAB — UPPER GI ENDOSCOPY: NORMAL

## 2023-09-27 PROCEDURE — 88305 TISSUE EXAM BY PATHOLOGIST: CPT | Mod: TC | Performed by: INTERNAL MEDICINE

## 2023-09-27 PROCEDURE — 43239 EGD BIOPSY SINGLE/MULTIPLE: CPT | Performed by: INTERNAL MEDICINE

## 2023-09-27 RX ORDER — KETAMINE HYDROCHLORIDE 10 MG/ML
INJECTION INTRAMUSCULAR; INTRAVENOUS PRN
Status: DISCONTINUED | OUTPATIENT
Start: 2023-09-27 | End: 2023-09-27

## 2023-09-27 RX ORDER — NALOXONE HYDROCHLORIDE 0.4 MG/ML
0.2 INJECTION, SOLUTION INTRAMUSCULAR; INTRAVENOUS; SUBCUTANEOUS
Status: DISCONTINUED | OUTPATIENT
Start: 2023-09-27 | End: 2023-09-28 | Stop reason: HOSPADM

## 2023-09-27 RX ORDER — NALOXONE HYDROCHLORIDE 0.4 MG/ML
0.4 INJECTION, SOLUTION INTRAMUSCULAR; INTRAVENOUS; SUBCUTANEOUS
Status: DISCONTINUED | OUTPATIENT
Start: 2023-09-27 | End: 2023-09-28 | Stop reason: HOSPADM

## 2023-09-27 RX ORDER — PROPOFOL 10 MG/ML
INJECTION, EMULSION INTRAVENOUS PRN
Status: DISCONTINUED | OUTPATIENT
Start: 2023-09-27 | End: 2023-09-27

## 2023-09-27 RX ORDER — LIDOCAINE HYDROCHLORIDE 20 MG/ML
INJECTION, SOLUTION INFILTRATION; PERINEURAL PRN
Status: DISCONTINUED | OUTPATIENT
Start: 2023-09-27 | End: 2023-09-27

## 2023-09-27 RX ORDER — FLUMAZENIL 0.1 MG/ML
0.2 INJECTION, SOLUTION INTRAVENOUS
Status: DISCONTINUED | OUTPATIENT
Start: 2023-09-27 | End: 2023-09-28 | Stop reason: HOSPADM

## 2023-09-27 RX ORDER — PROPOFOL 10 MG/ML
INJECTION, EMULSION INTRAVENOUS CONTINUOUS PRN
Status: DISCONTINUED | OUTPATIENT
Start: 2023-09-27 | End: 2023-09-27

## 2023-09-27 RX ORDER — SODIUM CHLORIDE, SODIUM LACTATE, POTASSIUM CHLORIDE, CALCIUM CHLORIDE 600; 310; 30; 20 MG/100ML; MG/100ML; MG/100ML; MG/100ML
INJECTION, SOLUTION INTRAVENOUS CONTINUOUS PRN
Status: DISCONTINUED | OUTPATIENT
Start: 2023-09-27 | End: 2023-09-27

## 2023-09-27 RX ORDER — LIDOCAINE 40 MG/G
CREAM TOPICAL
Status: DISCONTINUED | OUTPATIENT
Start: 2023-09-27 | End: 2023-09-27 | Stop reason: HOSPADM

## 2023-09-27 RX ORDER — PROCHLORPERAZINE MALEATE 10 MG
10 TABLET ORAL EVERY 6 HOURS PRN
Status: DISCONTINUED | OUTPATIENT
Start: 2023-09-27 | End: 2023-09-28 | Stop reason: HOSPADM

## 2023-09-27 RX ORDER — ONDANSETRON 2 MG/ML
4 INJECTION INTRAMUSCULAR; INTRAVENOUS
Status: DISCONTINUED | OUTPATIENT
Start: 2023-09-27 | End: 2023-09-27 | Stop reason: HOSPADM

## 2023-09-27 RX ORDER — ONDANSETRON 2 MG/ML
4 INJECTION INTRAMUSCULAR; INTRAVENOUS EVERY 6 HOURS PRN
Status: DISCONTINUED | OUTPATIENT
Start: 2023-09-27 | End: 2023-09-28 | Stop reason: HOSPADM

## 2023-09-27 RX ORDER — ONDANSETRON 4 MG/1
4 TABLET, ORALLY DISINTEGRATING ORAL EVERY 6 HOURS PRN
Status: DISCONTINUED | OUTPATIENT
Start: 2023-09-27 | End: 2023-09-28 | Stop reason: HOSPADM

## 2023-09-27 RX ORDER — GLYCOPYRROLATE 0.2 MG/ML
INJECTION, SOLUTION INTRAMUSCULAR; INTRAVENOUS PRN
Status: DISCONTINUED | OUTPATIENT
Start: 2023-09-27 | End: 2023-09-27

## 2023-09-27 RX ADMIN — KETAMINE HYDROCHLORIDE 30 MG: 10 INJECTION INTRAMUSCULAR; INTRAVENOUS at 14:40

## 2023-09-27 RX ADMIN — PROPOFOL 300 MCG/KG/MIN: 10 INJECTION, EMULSION INTRAVENOUS at 14:38

## 2023-09-27 RX ADMIN — SODIUM CHLORIDE, SODIUM LACTATE, POTASSIUM CHLORIDE, CALCIUM CHLORIDE: 600; 310; 30; 20 INJECTION, SOLUTION INTRAVENOUS at 14:36

## 2023-09-27 RX ADMIN — GLYCOPYRROLATE 0.2 MG: 0.2 INJECTION, SOLUTION INTRAMUSCULAR; INTRAVENOUS at 14:39

## 2023-09-27 RX ADMIN — LIDOCAINE HYDROCHLORIDE 40 MG: 20 INJECTION, SOLUTION INFILTRATION; PERINEURAL at 14:38

## 2023-09-27 RX ADMIN — PROPOFOL 100 MG: 10 INJECTION, EMULSION INTRAVENOUS at 14:38

## 2023-09-27 NOTE — ANESTHESIA PREPROCEDURE EVALUATION
Anesthesia Pre-Procedure Evaluation    Patient: Slim Orellana   MRN: [id number] : 2000        Procedure : Procedure(s):  ESOPHAGOGASTRODUODENOSCOPY, WITH BIOPSY          History reviewed. No pertinent past medical history.   History reviewed. No pertinent surgical history.   No Known Allergies   Social History     Tobacco Use    Smoking status: Never    Smokeless tobacco: Never   Substance Use Topics    Alcohol use: Not on file      Wt Readings from Last 1 Encounters:   23 83.9 kg (185 lb)        Anesthesia Evaluation            ROS/MED HX  ENT/Pulmonary: Comment: Urgent care at age 19 for SOB, does not regularly use inhaler     (+)                     asthma               (-) recent URI   Neurologic:       Cardiovascular:    (-) ARVIZU   METS/Exercise Tolerance:     Hematologic:    (-) anemia   Musculoskeletal:       GI/Hepatic:     (+)             liver disease,       Renal/Genitourinary:       Endo:       Psychiatric/Substance Use:     (+)   alcohol abuse  Recreational drug usage: Cannabis. (-) chronic opioid use history   Infectious Disease:    (-) Recent Fever   Malignancy:       Other:      (+)  , no H/O Chronic Pain,         Physical Exam    Airway        Mallampati: II   TM distance: > 3 FB   Neck ROM: full   Mouth opening: > 3 cm    Respiratory Devices and Support         Dental       (+) Minor Abnormalities - some fillings, tiny chips      Cardiovascular   cardiovascular exam normal          Pulmonary   pulmonary exam normal                OUTSIDE LABS:  CBC:   Lab Results   Component Value Date    WBC 8.1 2023    HGB 15.8 2023    HCT 47.2 2023     2023     BMP:   Lab Results   Component Value Date     2023    POTASSIUM 4.3 2023    CHLORIDE 105 2023    CO2 29 2023    BUN 11 2023    CR 1.05 2023    GLC 84 2023     COAGS: No results found for: PTT, INR, FIBR  POC: No results found for: BGM, HCG, HCGS  HEPATIC:   Lab  Results   Component Value Date    ALBUMIN 4.2 07/08/2023    PROTTOTAL 7.1 07/08/2023     (H) 07/08/2023    AST 59 (H) 07/08/2023    ALKPHOS 80 07/08/2023    BILITOTAL 0.6 07/08/2023     OTHER:   Lab Results   Component Value Date    DIANNA 9.6 07/08/2023    LIPASE 20 07/08/2023       Anesthesia Plan    ASA Status:  2    NPO Status:  NPO Appropriate    Anesthesia Type: MAC.              Consents    Anesthesia Plan(s) and associated risks, benefits, and realistic alternatives discussed. Questions answered and patient/representative(s) expressed understanding.     - Discussed: Risks, Benefits and Alternatives for BOTH SEDATION and the PROCEDURE were discussed     - Discussed with:  Patient            Postoperative Care       PONV prophylaxis: Ondansetron (or other 5HT-3)     Comments:                Jaren Lam MD

## 2023-09-27 NOTE — H&P
"Slim Orellana  [id number]  male  22 year old      Reason for procedure/surgery: Abdominal pain    There is no problem list on file for this patient.      Past Surgical History:  History reviewed. No pertinent surgical history.    Past Medical History: No past medical history on file.    Social History:   Social History     Tobacco Use    Smoking status: Never    Smokeless tobacco: Never   Substance Use Topics    Alcohol use: Not on file       Family History: History reviewed. No pertinent family history.    Allergies: No Known Allergies    Active Medications:   No current outpatient medications on file.       Systemic Review:   CONSTITUTIONAL: NEGATIVE for fever, chills, change in weight  ENT/MOUTH: NEGATIVE for ear, mouth and throat problems  RESP: NEGATIVE for significant cough or SOB  CV: NEGATIVE for chest pain, palpitations or peripheral edema    Physical Examination:   Vital Signs: /70 (BP Location: Right arm)   Pulse 65   Temp 98.5  F (36.9  C) (Temporal)   Resp 18   Ht 1.727 m (5' 8\")   Wt 83.9 kg (185 lb)   SpO2 99%   BMI 28.13 kg/m    GENERAL: healthy, alert and no distress  NECK: no adenopathy, no asymmetry, masses, or scars  RESP: lungs clear to auscultation - no rales, rhonchi or wheezes  CV: regular rate and rhythm, normal S1 S2, no S3 or S4, no murmur, click or rub, no peripheral edema and peripheral pulses strong  ABDOMEN: soft, nontender, no hepatosplenomegaly, no masses and bowel sounds normal  MS: no gross musculoskeletal defects noted, no edema    Plan: Appropriate to proceed as scheduled.      Alta Barbosa MD  9/27/2023    PCP:  No Ref-Primary, Physician   "

## 2023-09-27 NOTE — ANESTHESIA POSTPROCEDURE EVALUATION
Patient: Slim Orellana    Procedure: Procedure(s):  ESOPHAGOGASTRODUODENOSCOPY, WITH BIOPSY       Anesthesia Type:  MAC    Note:  Disposition: Outpatient   Postop Pain Control: Uneventful            Sign Out: Well controlled pain   PONV: No   Neuro/Psych: Uneventful            Sign Out: Acceptable/Baseline neuro status   Airway/Respiratory: Uneventful            Sign Out: Acceptable/Baseline resp. status   CV/Hemodynamics: Uneventful            Sign Out: Acceptable CV status; No obvious hypovolemia; No obvious fluid overload   Other NRE: NONE   DID A NON-ROUTINE EVENT OCCUR? No           Last vitals:  Vitals Value Taken Time   /74 09/27/23 1510   Temp 36.7  C (98  F) 09/27/23 1455   Pulse 81 09/27/23 1510   Resp 16 09/27/23 1510   SpO2 99 % 09/27/23 1510       Electronically Signed By: Jaren Lam MD  September 27, 2023  3:51 PM

## 2023-09-27 NOTE — LETTER
October 2, 2023      Slim Orellana  [id number] [facility] [address]        Dear ,    We are writing to inform you of your test results.    Plan for omeprazole daily 30 minutes before meals x 8 weeks. Complete alcohol cessation.     Resulted Orders   Surgical Pathology Exam   Result Value Ref Range    Case Report       Surgical Pathology Report                         Case: [id number]                                  Authorizing Provider:  Alta Barbosa     Collected:           09/27/2023 02:44 PM                                 MD Ace                                                                    Ordering Location:     [facility] OR  Received:            09/27/2023 03:08 PM                                 [place]                                                                  Pathologist:           Isacc Mas DO                                                            Specimens:   A) - Other, duodenum biopsies                                                                       B) - Other, gastric biopsies                                                               Addendum        B.  GASTRIC, BIOPSY:  - No H. pylori organisms identified (supported by a Helicobacter immunohistochemical stain)    The pattern of inflammation is suggestive of Helicobacter infection.  The lack of identifiable organisms may be explained by treatment effect (including antibiotics and proton pump inhibitors) or the often patchy nature of this disorder.  This inflammatory pattern may also represent an upper GI manifestation of idiopathic inflammatory bowel disease in patients with such conditions.        Final Diagnosis       A.  DUODENUM, BIOPSY:  - Unremarkable duodenal mucosa  - No evidence of celiac disease    B.  GASTRIC, BIOPSY:  - Chronic inactive gastritis  - An immunohistochemical stain to rule out Helicobacter will be performed and reported in an addendum  - No  "intestinal metaplasia identified        Clinical Information       Abdominal pain, left upper quadrant      Gross Description       A(1). Other, duodenum biopsies:  The specimen is received in formalin with proper patient identification labeled \" duodenum biopsies\".  The specimen consists of 5 tan pieces of soft tissue, ranging from 0.1-0.3 cm in greatest dimension.  Entirely submitted in cassette A1.  B(2). Other, gastric biopsies:  The specimen is received in formalin with proper patient identification labeled \" gastric biopsies\".  The specimen consists of 4 tan pieces of soft tissue, ranging from 0.1-0.7 cm in greatest dimension.  Entirely submitted in cassette B1.      Microscopic Description       Microscopic examination was performed.        Performing Labs       The technical component of this testing was completed at [facility]      Case Images         If you have any questions or concerns, please call the clinic at the number listed above.       Sincerely,      Alta Barbosa MD            "

## 2023-09-28 PROCEDURE — 88305 TISSUE EXAM BY PATHOLOGIST: CPT | Mod: 26 | Performed by: PATHOLOGY

## 2023-10-02 LAB
PATH REPORT.ADDENDUM SPEC: NORMAL
PATH REPORT.COMMENTS IMP SPEC: NORMAL
PATH REPORT.COMMENTS IMP SPEC: NORMAL
PATH REPORT.FINAL DX SPEC: NORMAL
PATH REPORT.GROSS SPEC: NORMAL
PATH REPORT.MICROSCOPIC SPEC OTHER STN: NORMAL
PATH REPORT.RELEVANT HX SPEC: NORMAL
PHOTO IMAGE: NORMAL

## 2023-10-02 PROCEDURE — 88342 IMHCHEM/IMCYTCHM 1ST ANTB: CPT | Mod: 26 | Performed by: PATHOLOGY

## (undated) DEVICE — SUCTION CATH AIRLIFE TRI-FLO W/CONTROL PORT 14FR  T60C

## (undated) DEVICE — TUBING SUCTION MEDI-VAC 1/4"X20' N620A

## (undated) DEVICE — SOL WATER IRRIG 500ML BOTTLE 2F7113

## (undated) DEVICE — KIT ENDO FIRST STEP DISINFECTANT 200ML W/POUCH EP-4

## (undated) DEVICE — SUCTION MANIFOLD NEPTUNE 2 SYS 1 PORT 702-025-000

## (undated) DEVICE — KIT ENDO TURNOVER/PROCEDURE CARRY-ON 101822

## (undated) DEVICE — SYR 30ML SLIP TIP W/O NDL 302833

## (undated) DEVICE — ENDO BITE BLOCK ADULT OMNI-BLOC

## (undated) DEVICE — SPECIMEN CONTAINER 3OZ W/FORMALIN 59901

## (undated) DEVICE — ENDO FORCEP BX CAPTURA PRO SPIKE G50696